# Patient Record
Sex: FEMALE | Race: ASIAN | NOT HISPANIC OR LATINO
[De-identification: names, ages, dates, MRNs, and addresses within clinical notes are randomized per-mention and may not be internally consistent; named-entity substitution may affect disease eponyms.]

---
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
## 2020-02-21 ENCOUNTER — TRANSCRIPTION ENCOUNTER (OUTPATIENT)
Age: 20
End: 2020-02-21

## 2020-02-26 ENCOUNTER — TRANSCRIPTION ENCOUNTER (OUTPATIENT)
Age: 20
End: 2020-02-26

## 2020-03-04 PROBLEM — Z00.00 ENCOUNTER FOR PREVENTIVE HEALTH EXAMINATION: Status: ACTIVE | Noted: 2020-03-04

## 2020-03-05 ENCOUNTER — APPOINTMENT (OUTPATIENT)
Dept: OTOLARYNGOLOGY | Facility: CLINIC | Age: 20
End: 2020-03-05
Payer: COMMERCIAL

## 2020-03-05 DIAGNOSIS — Z86.2 PERSONAL HISTORY OF DISEASES OF THE BLOOD AND BLOOD-FORMING ORGANS AND CERTAIN DISORDERS INVOLVING THE IMMUNE MECHANISM: ICD-10-CM

## 2020-03-05 DIAGNOSIS — Z83.3 FAMILY HISTORY OF DIABETES MELLITUS: ICD-10-CM

## 2020-03-05 PROCEDURE — 31231 NASAL ENDOSCOPY DX: CPT

## 2020-03-05 PROCEDURE — 99213 OFFICE O/P EST LOW 20 MIN: CPT | Mod: 25

## 2020-03-11 LAB — BACTERIA FLD CULT: NORMAL

## 2021-02-24 ENCOUNTER — APPOINTMENT (OUTPATIENT)
Dept: OTOLARYNGOLOGY | Facility: CLINIC | Age: 21
End: 2021-02-24
Payer: COMMERCIAL

## 2021-02-24 VITALS — HEIGHT: 64 IN | TEMPERATURE: 98 F | WEIGHT: 120 LBS | BODY MASS INDEX: 20.49 KG/M2

## 2021-02-24 PROCEDURE — 99214 OFFICE O/P EST MOD 30 MIN: CPT | Mod: 25

## 2021-02-24 PROCEDURE — 99072 ADDL SUPL MATRL&STAF TM PHE: CPT

## 2021-02-24 PROCEDURE — 31231 NASAL ENDOSCOPY DX: CPT

## 2021-03-01 ENCOUNTER — APPOINTMENT (OUTPATIENT)
Dept: ULTRASOUND IMAGING | Facility: CLINIC | Age: 21
End: 2021-03-01
Payer: COMMERCIAL

## 2021-03-01 ENCOUNTER — OUTPATIENT (OUTPATIENT)
Dept: OUTPATIENT SERVICES | Facility: HOSPITAL | Age: 21
LOS: 1 days | End: 2021-03-01

## 2021-03-01 ENCOUNTER — RESULT REVIEW (OUTPATIENT)
Age: 21
End: 2021-03-01

## 2021-03-01 ENCOUNTER — APPOINTMENT (OUTPATIENT)
Dept: CT IMAGING | Facility: CLINIC | Age: 21
End: 2021-03-01
Payer: COMMERCIAL

## 2021-03-01 PROCEDURE — 76536 US EXAM OF HEAD AND NECK: CPT | Mod: 26

## 2021-03-01 PROCEDURE — 70486 CT MAXILLOFACIAL W/O DYE: CPT | Mod: 26

## 2021-03-02 ENCOUNTER — TRANSCRIPTION ENCOUNTER (OUTPATIENT)
Age: 21
End: 2021-03-02

## 2021-03-02 LAB — BACTERIA FLD CULT: ABNORMAL

## 2021-03-03 NOTE — PHYSICAL EXAM
[FreeTextEntry1] : General:\par The patient was alert and oriented and in no distress.\par Voice was clear. Her temperature was 98.0\par \par Face:\par The patient had no facial asymmetry or mass.\par The skin was unremarkable.\par \par Ears:\par The external ears were normal without deformity.\par The ear canals were clear.\par The tympanic membranes were intact and normal.\par \par Nose: \par The external nose had no significant deformity.  There was no facial tenderness.  On anterior rhinoscopy, the nasal mucosa was clear.  The anterior septum was midline.  There were no visualized polyps purulence  or masses.\par \par Neck:\par She had a moderately tender 2 cm right level V lymph node\par \par Oral cavity:\par The oral mucosa was normal.\par The oral and base of tongue were clear and without mass.\par The gingival and buccal mucosa were moist and without lesions.\par The palate moved well.\par There was no cleft to the palate.\par There appeared to be good salivary flow.  \par There was no pus, erythema or mass in the oral cavity.\par \par Neuro:\par Neurologically, the patient was awake, alert, and oriented to person, place and time. There were no obvious focal neurologic abnormalities.  Cranial nerves II through XII were grossly intact.\par \par Nasal endoscopy: \par CPT 01201\par Procedure Note:\par \par Endoscopy was done with Covid precautions and with video. All risks and benefits were discussed with the patient and consent obtained.\par \par Nasal endoscopy was done with topical anesthesia of Pontocaine and Afrin and a      nasal endoscope.\par Indication: Nasal congestion, rule out sinusitis.\par Procedure: The nasal cavity was anesthetized with topical Afrin and Pontocaine. An  endoscope was used and inserted into the nasal cavity.\par Attention was first paid to the anterior nasal cavity.\par Endocoscopy was performed to inspect the interior of the nasal cavity, the nasal septum,  the middle and superior meati, the inferior, middle and superior turbinates, and the spheno-ethmoidal  recesses, the nasopharynx and eustachian tube orifices bilaterally. \par All findings were normal except:\par \par \par Nasal mucosa is beefy and inflamed with a right septal deflection coming back to the left. There was thick dry but clear mucus in the middle meati were no polyps, purulence or masses\par The nasopharynx is benign\par \par Flexible fiberoptic laryngoscopy: [facility] [address]\par Indications: Dysphagia\par Procedure note:\par \par Flexible fiberoptic laryngoscopy was performed because of dysphagia and because of the patient's inability to tolerate adequate mirror examination.\par \par The nasal cavity was anesthetized with Pontocaine and Afrin.\par The flexible endoscope was placed into the patient's nasal cavity.\par The nasopharynx was without masses.\par The oropharynx, vallecula and base of tongue had no masses.\par The epiglottis, aryepiglottic folds and false vocal cords were normal.\par The pyriform sinuses were without mucosal lesions or pooling of secretions.  \par The laryngeal ventricles were without lesions.\par The visualized subglottis was without mass.\par The lateral and posterior pharyngeal walls were clear and symmetrical.\par The vocal folds were clear and mobile; they abducted and adducted normally.\par There was no interarytenoid mass or fullness.\par \par Endoscopy was done with Covid precautions and with video. All risks and benefits were discussed with the patient and consent obtained.\par \par Then endoscopically guided culture was taken of the right middle meatus\par

## 2021-03-03 NOTE — CONSULT LETTER
[DrAkua  ___] : Dr. AARON [FreeTextEntry2] : Glen Sheth MD [FreeTextEntry1] : \par \par Dear  Glen\par \par I had the pleasure of seeing your patient today.  \par Please see my note below.\par \par \par Thank you very much for allowing me to participate in the care of your patient.\par \par Sincerely,\par \par \par I hope this note finds you and your family  well.   \par \par \par Sumeet\par \par \par \par \par Hans Baptiste MD\par [facility]\par [facility]\par  [facility]\par \par

## 2021-03-03 NOTE — HISTORY OF PRESENT ILLNESS
[de-identified] : BILL WESTBROOK In consultation from Dr. Sheth on February 24.She has a long history of allergies and sinus disease. However, for the last 2-1/2 months she has not been doing well. She has felt nauseous and dehydrated. She has had fevers sometimes 100.6 and more recently 102, even through a week of Augmentin. She has headaches and a relatively clear drainage and feels as if she is gagging. She had evaluation including blood tests which were relatively normal other than for slightly elevated leukocyte count. She's had a swollen lymph node in the neck but recently has gotten smaller.She had a general medical evaluation as well.The patient had no other ear nose or throat complaints at this visit.

## 2021-03-03 NOTE — ADDENDUM
[FreeTextEntry1] : 3/2/21    Culture scant p acnes.  (sensitive)\par 3/3/21   ultrasound   Left level 1 and several right level 5 nodes.\par 3/3/21   ct shows dns, narrowed omu with holden bullosa-  no signficant persistent mp disease.\par     benign appearing suprahyoid nodes.\par \par Consider ID consult/fna... if persists.

## 2021-03-03 NOTE — ASSESSMENT
[FreeTextEntry1] : It was my impression that she has a probable underlying chronic sinusitis and nasal allergies and septal deflection. However, her current symptoms including fever are not likely from sinusitis.\par She has adenopathy and intermittent fever.\par She had a very slight elevation of her lymphocyte count.\par Her Covid testing was negative, but this seems more likely viral especially without improvement with Augmentin.\par I suggested continuing with antibiotics and add Flonase as and nasal steroid pending culture results, as this could be a persistent organism.\par I recommended a brief course of prednisone and suggested both sinus imaging an ultrasound of the neck.\par If the adenopathy persists I would plan fine-needle aspiration as well.  \par \par \par

## 2021-03-08 ENCOUNTER — APPOINTMENT (OUTPATIENT)
Dept: OTOLARYNGOLOGY | Facility: CLINIC | Age: 21
End: 2021-03-08
Payer: COMMERCIAL

## 2021-03-08 VITALS — TEMPERATURE: 97 F | WEIGHT: 120 LBS | HEIGHT: 64 IN | BODY MASS INDEX: 20.49 KG/M2

## 2021-03-08 DIAGNOSIS — R59.0 LOCALIZED ENLARGED LYMPH NODES: ICD-10-CM

## 2021-03-08 DIAGNOSIS — J01.01 ACUTE RECURRENT MAXILLARY SINUSITIS: ICD-10-CM

## 2021-03-08 DIAGNOSIS — J34.89 OTHER SPECIFIED DISORDERS OF NOSE AND NASAL SINUSES: ICD-10-CM

## 2021-03-08 DIAGNOSIS — J30.9 ALLERGIC RHINITIS, UNSPECIFIED: ICD-10-CM

## 2021-03-08 DIAGNOSIS — J01.21 ACUTE RECURRENT ETHMOIDAL SINUSITIS: ICD-10-CM

## 2021-03-08 PROCEDURE — 99214 OFFICE O/P EST MOD 30 MIN: CPT | Mod: 25

## 2021-03-08 PROCEDURE — 99072 ADDL SUPL MATRL&STAF TM PHE: CPT

## 2021-03-08 PROCEDURE — 31231 NASAL ENDOSCOPY DX: CPT

## 2021-04-19 ENCOUNTER — TRANSCRIPTION ENCOUNTER (OUTPATIENT)
Age: 21
End: 2021-04-19

## 2021-04-19 DIAGNOSIS — Z01.818 ENCOUNTER FOR OTHER PREPROCEDURAL EXAMINATION: ICD-10-CM

## 2021-04-20 LAB
ANION GAP SERPL CALC-SCNC: 15 MMOL/L
APTT BLD: 33.3 SEC
BASOPHILS # BLD AUTO: 0.06 K/UL
BASOPHILS NFR BLD AUTO: 0.9 %
BUN SERPL-MCNC: 9 MG/DL
CALCIUM SERPL-MCNC: 9.6 MG/DL
CHLORIDE SERPL-SCNC: 102 MMOL/L
CO2 SERPL-SCNC: 23 MMOL/L
CREAT SERPL-MCNC: 0.72 MG/DL
EOSINOPHIL # BLD AUTO: 0.13 K/UL
EOSINOPHIL NFR BLD AUTO: 2 %
GLUCOSE SERPL-MCNC: 57 MG/DL
HCT VFR BLD CALC: 41.4 %
HGB BLD-MCNC: 12.9 G/DL
IMM GRANULOCYTES NFR BLD AUTO: 0.6 %
INR PPP: 1.15 RATIO
LYMPHOCYTES # BLD AUTO: 2.95 K/UL
LYMPHOCYTES NFR BLD AUTO: 45.6 %
MAN DIFF?: NORMAL
MCHC RBC-ENTMCNC: 27.2 PG
MCHC RBC-ENTMCNC: 31.2 GM/DL
MCV RBC AUTO: 87.2 FL
MONOCYTES # BLD AUTO: 0.4 K/UL
MONOCYTES NFR BLD AUTO: 6.2 %
NEUTROPHILS # BLD AUTO: 2.89 K/UL
NEUTROPHILS NFR BLD AUTO: 44.7 %
PLATELET # BLD AUTO: 314 K/UL
POTASSIUM SERPL-SCNC: 4.3 MMOL/L
PT BLD: 13.5 SEC
RBC # BLD: 4.75 M/UL
RBC # FLD: 13.6 %
SODIUM SERPL-SCNC: 140 MMOL/L
WBC # FLD AUTO: 6.47 K/UL

## 2021-04-20 NOTE — ASSESSMENT
[FreeTextEntry1] : It was my impression that her acute presentation was that of a resolving viral syndrome with fever and adenopathy and a very slight elevated white blood cell count. Her lyme titers were negative but I did also want her to have mono titers taken and this was recommended. Otherwise I would not further evaluate unless some of the symptoms persist she still not some fatigue and colored discharge\par Her chronic symptoms although from her nose and sinuses, however. Since childhood she has nasal obstruction. She has been treated for her allergies. Her exam and her CT showed a significant inferior turbinate mucosal edema deep bogginess of the septal mucosa with the sharp right obstructing septal deflection. 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 \par She also is a vocalist.  I explained to her that there could be some and in fact would likely be some change to her resonance.  She may find this an improvement or worsening of her vocal qualities and I explained that she should decide about whether to go ahead with the possibility of worsening of her vocal qualities.   I would consider using an LMA at any surgical procedure so as to not put a tube to her vocal cords\par

## 2021-04-20 NOTE — ADDENDUM
[FreeTextEntry1] : 3/11/21  mono negative.  RLP\par \par 4/20/21   glucose 57, slight lymphocytosis.   negative covid test.  RLP

## 2021-04-20 NOTE — CONSULT LETTER
[DrAkua  ___] : Dr. AARON [FreeTextEntry2] : Glen Sheth MD [FreeTextEntry1] : \par \par Dear  Glen\par \par I had the pleasure of seeing your patient today.  \par Please see my note below.\par \par \par Thank you very much for allowing me to participate in the care of your patient.\par \par Sincerely,\par \par \par I hope this note finds you and your family  well.   \par \par \par Sumeet\par \par \par \par \par Hans Baptiste MD\par [facility]\par [facility]\par  [facility]\par \par

## 2021-04-20 NOTE — PHYSICAL EXAM
[FreeTextEntry1] : General:\par The patient was alert and oriented and in no distress.\par Voice was clear. She looked better.\par \par Face:\par The patient had no facial asymmetry or mass.\par The skin was unremarkable.\par \par Ears:\par The external ears were normal without deformity.\par The ear canals were clear.\par The tympanic membranes were intact and normal.\par \par Oral cavity:\par The oral mucosa was normal.\par The oral and base of tongue were clear and without mass.\par The gingival and buccal mucosa were moist and without lesions.\par The palate moved well.\par There was no cleft to the palate.\par There appeared to be good salivary flow.  \par There was no pus, erythema or mass in the oral cavity.\par \par Neck: \par The neck was symmetrical.\par The parotid and submandibular glands were normal without masses.\par The trachea was midline and there was no unusual crepitus.\par The thyroid was smooth and nontender and no masses were palpated.\par There was no significant cervical adenopathy.\par The adenopathy was much better\par \par Neuro:\par Neurologically, the patient was awake, alert, and oriented to person, place and time. There were no obvious focal neurologic abnormalities.  Cranial nerves II through XII were grossly intact.\par \par  [de-identified] : Nasal endoscopy: \par CPT 61552\par Procedure Note:\par \par Endoscopy was done with Covid precautions and with video. All risks and benefits were discussed with the patient and consent obtained.\par \par Nasal endoscopy was done with topical anesthesia of Pontocaine and Afrin and a      nasal endoscope.\par Indication: Nasal congestion, rule out sinusitis.\par Procedure: The nasal cavity was anesthetized with topical Afrin and Pontocaine. An  endoscope was used and inserted into the nasal cavity.\par Attention was first paid to the anterior nasal cavity.\par Endocoscopy was performed to inspect the interior of the nasal cavity, the nasal septum,  the middle and superior meati, the inferior, middle and superior turbinates, and the spheno-ethmoidal  recesses, the nasopharynx and eustachian tube orifices bilaterally. \par All findings were normal except:\par \par This was done with her CT for guidance.\par This showed that the nasal mucosa was pale boggy and watery. She had significant inferior turbinate hypertrophy bilaterally almost obstructing the airway and in narrowing of both osteomeatal units with a right septal deflection\par \par Her CT scan showed the inferior turbinate hypertrophy and a septal deflection. She has hyperinflation of her ethmoids and significant narrowing of the osteomeatal units.\par She has hyper pneumatization of the ethmoids and she has holden bullosa bilaterally without significant frontal sinus formation.

## 2021-04-20 NOTE — HISTORY OF PRESENT ILLNESS
[de-identified] : BILL WESTBROOK Was seen in followup on March 8. She was doing better. She no longer had a fever and her adenopathy was improved. She still gets a bit of color to nasal discharge. She had ultrasound of the neck which showed multiple small benign appearing lymph nodes. She had CT imaging of the paranasal sinuses and comes in for repeat evaluation.The patient had no other ear nose or throat complaints at this visit.

## 2021-04-21 ENCOUNTER — TRANSCRIPTION ENCOUNTER (OUTPATIENT)
Age: 21
End: 2021-04-21

## 2021-04-22 ENCOUNTER — RESULT REVIEW (OUTPATIENT)
Age: 21
End: 2021-04-22

## 2021-04-22 ENCOUNTER — OUTPATIENT (OUTPATIENT)
Dept: OUTPATIENT SERVICES | Facility: HOSPITAL | Age: 21
LOS: 1 days | Discharge: HOME | End: 2021-04-22
Payer: COMMERCIAL

## 2021-04-22 ENCOUNTER — APPOINTMENT (OUTPATIENT)
Dept: OTOLARYNGOLOGY | Facility: AMBULATORY SURGERY CENTER | Age: 21
End: 2021-04-22

## 2021-04-22 PROCEDURE — 61782 SCAN PROC CRANIAL EXTRA: CPT

## 2021-04-22 PROCEDURE — 30520 REPAIR OF NASAL SEPTUM: CPT

## 2021-04-22 PROCEDURE — 88305 TISSUE EXAM BY PATHOLOGIST: CPT | Mod: 26

## 2021-04-22 PROCEDURE — 88300 SURGICAL PATH GROSS: CPT | Mod: 26,59

## 2021-04-22 PROCEDURE — 31267 ENDOSCOPY MAXILLARY SINUS: CPT | Mod: 50

## 2021-04-22 PROCEDURE — 31255 NSL/SINS NDSC W/TOT ETHMDCT: CPT | Mod: 50

## 2021-04-22 PROCEDURE — 88311 DECALCIFY TISSUE: CPT | Mod: 26

## 2021-04-22 PROCEDURE — 30140 RESECT INFERIOR TURBINATE: CPT | Mod: 50

## 2021-04-28 ENCOUNTER — APPOINTMENT (OUTPATIENT)
Dept: OTOLARYNGOLOGY | Facility: CLINIC | Age: 21
End: 2021-04-28
Payer: COMMERCIAL

## 2021-04-28 VITALS — WEIGHT: 120 LBS | TEMPERATURE: 96.6 F | BODY MASS INDEX: 20.49 KG/M2 | HEIGHT: 64 IN

## 2021-04-28 PROCEDURE — 31237 NSL/SINS NDSC SURG BX POLYPC: CPT | Mod: 50,58

## 2021-04-28 PROCEDURE — 99072 ADDL SUPL MATRL&STAF TM PHE: CPT

## 2021-04-28 NOTE — HISTORY OF PRESENT ILLNESS
[de-identified] : BILL WESTBROOK Was seen in followup on April 28 her fifth postoperative day. She had an obstructing left septal deflection coming back to the right and bilateral middle meatal obstruction. Her surgery was uneventful in her early postoperative course has been good. She comes in for first postoperative debridement

## 2021-04-28 NOTE — PHYSICAL EXAM
[FreeTextEntry1] : The patient returns for postoperative sinus debridement CPT 44357-43.\par \par Procedure note:\par \par The nasal cavity was anesthetized topically and locally.  Both rigid 0 and 30° endoscopes were used for debridement.  The septal splint was removed.  There was no evidence of hematoma.  Using straight and curved suctions and straight and 30° up-biting forceps, the nasal cavity and sinuses were debrided anteriorly.  The patient appeared to be having an excellent result.  Propel was left in place.  There were no complications.  I placed the patient on budesonide rinse and hypertonic saline rinses and will see the patient back in 7-10 days for a repeat middle meatal debridement.\par \par

## 2021-04-28 NOTE — CONSULT LETTER
[FreeTextEntry2] : Glen Sheth MD [FreeTextEntry1] : \par \par Dear  Glen\par \par I had the pleasure of seeing your patient today.  \par Please see my note below.\par \par \par Thank you very much for allowing me to participate in the care of your patient.\par \par Sincerely,\par \par \par I hope this note finds you and your family  well.   \par \par \par Sumeet\par \par \par \par \par Hans Baptiste MD\par [facility]\par [facility]\par  [facility]\par \par  [DrAkua  ___] : Dr. AARON

## 2021-05-02 LAB — SURGICAL PATHOLOGY STUDY: SIGNIFICANT CHANGE UP

## 2021-05-05 ENCOUNTER — APPOINTMENT (OUTPATIENT)
Dept: OTOLARYNGOLOGY | Facility: CLINIC | Age: 21
End: 2021-05-05
Payer: COMMERCIAL

## 2021-05-05 VITALS — HEIGHT: 64 IN | BODY MASS INDEX: 20.49 KG/M2 | TEMPERATURE: 93.7 F | WEIGHT: 120 LBS

## 2021-05-05 PROCEDURE — 31237 NSL/SINS NDSC SURG BX POLYPC: CPT | Mod: 50,58

## 2021-05-05 PROCEDURE — 99072 ADDL SUPL MATRL&STAF TM PHE: CPT

## 2021-05-05 NOTE — PHYSICAL EXAM
[FreeTextEntry1] : The patient is status post endoscopic sinus surgery and returns for middle meatal debridement.\par \par Procedure note:  99887-96\par \par The nasal cavity was anesthetized topically and locally.  Using a rigid 0° and a rigid 30° endoscope, using both straight and curved suction iand a straight and the 30° up-biting forceps, both middle meati were suctioned and debrided to clear.  The ethmoids, antra and frontal recesses appeared to be having an excellent result.  The septum was midline and without hematoma.  The inferior turbinates were debrided again. \par \par She appears to be having an excellent result and I suggested using budesonide rinses twice a day

## 2021-05-05 NOTE — CONSULT LETTER
[FreeTextEntry2] : Glen Sheth MD [FreeTextEntry1] : \par \par Dear  Glen\par \par I had the pleasure of seeing your patient today.  \par Please see my note below.\par \par \par Thank you very much for allowing me to participate in the care of your patient.\par \par Sincerely,\par \par \par I hope this note finds you and your family  well.   \par \par \par Sumeet\par \par \par \par \par Hans Baptiste MD\par [facility]\par [facility]\par  [facility]\par \par  [DrAkua  ___] : Dr. AARON

## 2021-05-17 ENCOUNTER — APPOINTMENT (OUTPATIENT)
Dept: OTOLARYNGOLOGY | Facility: CLINIC | Age: 21
End: 2021-05-17
Payer: COMMERCIAL

## 2021-05-17 VITALS — WEIGHT: 120 LBS | HEIGHT: 64 IN | TEMPERATURE: 96.4 F | BODY MASS INDEX: 20.49 KG/M2

## 2021-05-17 PROCEDURE — 31237 NSL/SINS NDSC SURG BX POLYPC: CPT | Mod: 50,58

## 2021-05-17 PROCEDURE — 99072 ADDL SUPL MATRL&STAF TM PHE: CPT

## 2021-05-17 NOTE — HISTORY OF PRESENT ILLNESS
[de-identified] : BILL WESTBROOK Was seen in followup on May 17. She is status post endoscopic sinus surgery for an obstructing left septal deflection coming back to the right with bilateral middle meatal obstruction on April 23 and early polypoid changes. She is doing quite well but notices some discharge still. She comes in for postoperative debridement. She remains on budesonide rinses, hopefully once a day.The patient had no other ear nose or throat complaints at this visit.

## 2021-05-17 NOTE — PHYSICAL EXAM
[FreeTextEntry1] : The patient is status post endoscopic sinus surgery and returns for middle meatal debridement.\par \par Procedure note:  66546-61\par \par The nasal cavity was anesthetized topically and locally.  Using a rigid 0° and a rigid 30° endoscope, using both straight and curved suction iand a straight and the 30° up-biting forceps, both middle meati were suctioned and debrided to clear. She still had some residual propel, more on the right than on the left The ethmoids, antra and frontal recesses appeared to be having an excellent result.  The septum was midline and without hematoma.  The inferior turbinates were debrided again.  The patient was told to continue on budesonide rinse once a day I will see her back in followup one more time in a month and indicated she can still use to rule over-the-counter antihistamines as needed for her allergy season

## 2021-05-21 ENCOUNTER — APPOINTMENT (OUTPATIENT)
Dept: OTOLARYNGOLOGY | Facility: CLINIC | Age: 21
End: 2021-05-21
Payer: COMMERCIAL

## 2021-05-21 VITALS — HEIGHT: 64 IN | BODY MASS INDEX: 20.49 KG/M2 | WEIGHT: 120 LBS | TEMPERATURE: 94.5 F

## 2021-05-21 PROCEDURE — 99072 ADDL SUPL MATRL&STAF TM PHE: CPT

## 2021-05-21 PROCEDURE — 31237 NSL/SINS NDSC SURG BX POLYPC: CPT | Mod: 58,RT

## 2021-05-28 NOTE — ASSESSMENT
[FreeTextEntry1] : It was my impression that she has residual propel and a secondary postoperative infection on the right. Pending the culture results, I recommended a course of cefuroxime and continuing with nasal rinses and would like to see her back in followup in 2-3 weeks to make sure that this has resolved. Otherwise, the sinuses looked excellent.

## 2021-05-28 NOTE — ADDENDUM
[FreeTextEntry1] : 5/28/21  finally,  culture came back today.  While had a prelim of staph and enterobacter- final just enterobacter.  was resistant to ceftin and rather than just swithcing now, will wait and see her back next week.  Iff not better, will switch-  but she is allergic to bactrim.   \par \par RLP

## 2021-05-28 NOTE — PHYSICAL EXAM
[de-identified] : On examination, pertinent findings were limited to her nasal endoscopy.\par \par On the left side, the septum was midline the middle turbinate was reduced and the sinuses were all widely patent and healthy.\par On the right, there was yellow green discharge and crusting mixed with a small amount of residual propel. With further anesthesia and a rigid pediatric 30° endoscope, the crusting and residual propel were removed sharply. The-colored discharge was cultured and then the ethmoid and maxillary sinuses suctioned to clear\par CPT 07034\par \par

## 2021-05-28 NOTE — HISTORY OF PRESENT ILLNESS
[de-identified] : BILL WESTBROOK Was seen in followup on May 21. She comes in because she feels as if there is still something in the sinuses on the right. She is having some yellow color with the rinses.\par The patient had no other ear nose or throat complaints at this visit.

## 2021-06-04 ENCOUNTER — APPOINTMENT (OUTPATIENT)
Dept: OTOLARYNGOLOGY | Facility: CLINIC | Age: 21
End: 2021-06-04
Payer: COMMERCIAL

## 2021-06-04 VITALS — WEIGHT: 120 LBS | TEMPERATURE: 97.9 F | BODY MASS INDEX: 20.49 KG/M2 | HEIGHT: 64 IN

## 2021-06-04 DIAGNOSIS — J34.2 DEVIATED NASAL SEPTUM: ICD-10-CM

## 2021-06-04 PROCEDURE — 99024 POSTOP FOLLOW-UP VISIT: CPT

## 2021-06-04 PROCEDURE — 31231 NASAL ENDOSCOPY DX: CPT | Mod: 58

## 2021-06-04 NOTE — PHYSICAL EXAM
[FreeTextEntry1] : \par The patient was alert and oriented and in no distress.\par Voice was clear.\par \par Face:\par The patient had no facial asymmetry or mass.\par The skin was unremarkable.\par \par Eyes:\par The pupils were equal round and reactive to light and accommodation.\par There was no significant nystagmus or disconjugate gaze noted.\par \par Nose: \par The external nose had no significant deformity.  There was no facial tenderness.  On anterior rhinoscopy, the nasal mucosa was clear.  The anterior septum was midline.  There were no visualized polyps purulence  or masses.\par \par Oral cavity:\par The oral mucosa was normal.\par The oral and base of tongue were clear and without mass.\par The gingival and buccal mucosa were moist and without lesions.\par The palate moved well.\par There was no cleft to the palate.\par There appeared to be good salivary flow.  \par There was no pus, erythema or mass in the oral cavity.\par \par \par Ears:\par The external ears were normal without deformity.\par The ear canals were clear.\par The tympanic membranes were intact and normal.\par \par Neck: \par The neck was symmetrical.\par The parotid and submandibular glands were normal without masses.\par The trachea was midline and there was no unusual crepitus.\par The thyroid was smooth and nontender and no masses were palpated.\par There was no significant cervical adenopathy.\par \par \par Neuro:\par Neurologically, the patient was awake, alert, and oriented to person, place and time. There were no obvious focal neurologic abnormalities.  Cranial nerves II through XII were grossly intact.\par \par \par TMJ:\par The temporomandibular joints were nontender.\par There was no abnormal crepitus and no significant malocclusion\par \par \par Nasal endoscopy:\par \par Nasal endoscopy was done with topical anesthesia and a flexible endoscope to evaluate for nasal polyps, chronic sinusitis and response to therapy.\par Endocoscopy was performed to inspect the interior of the nasal cavity, the nasal septum,  the middle and superior meati, the inferior, middle and superior turbinates, and the spheno-ethmoidal  recesses, the nasopharynx and eustachian tube orifices bilaterally\par \par Endoscopy was done with Covid precautions and with video. All risks and benefits were discussed with the patient and consent obtained.\par \par \par Evaluation showed that the septum was midline.  There was no significant mucosal disease.  The inferior turbinates and middle turbinates were normal.  On both sides, the surgically opened ethmoids, antra, and frontal recesses were clear.  There was no evidence of polypoid recurrences and no purulence.  The mucosa was close to stage zero.  The nasopharynx was benign.    The middle and  superior meati were normal and the sphenoethmoidal recesses were without evidence of disease\par There was a small area of granulation healing on the right lateral wall

## 2021-06-04 NOTE — CONSULT LETTER
[DrAkua  ___] : Dr. AARON [FreeTextEntry2] : Glen Sheth MD [FreeTextEntry1] : \par \par Dear  Glen\par \par I had the pleasure of seeing your patient today.  \par Please see my note below.\par \par \par Thank you very much for allowing me to participate in the care of your patient.\par \par Sincerely,\par \par \par \par I hope this note finds you and your family  well.    Congratulations on your new office... Saw it in [employer]...... \par \par \par Sumeet\par \par \par \par \par Hans Baptiste MD\par [facility]\par [facility]\par  [facility]\par \par

## 2021-06-04 NOTE — HISTORY OF PRESENT ILLNESS
[de-identified] : BILL WESTBROOK Was seen in followup on June 4.She is status post endoscopic sinus surgery on April 22 for chronic sinusitis septal deflection and polypoid degeneration. At this point she's doing quite well. She is using budesonide rinses once a day. She has significant allergies and has had desensitization for more than 4 years.She comes in for repeat evaluation

## 2021-06-04 NOTE — ASSESSMENT
[FreeTextEntry1] : It was my impression that she had an excellent result from this surgery. However, with her significant allergy history I suggested continuing on medical and allergy care. She has had desensitization to maximal effect. I explained the likelihood of polyps recurring should she do no further intervention. At this point, she will try to use the budesonide rinse every other day and I would like to reevaluate in 3 months to make sure there is no recurrence. The option of an interleukin antagonist-like Dupixent was discussed but not recommended at this time.  I asked her to speak to Dr. Sheth as well.

## 2021-08-20 ENCOUNTER — APPOINTMENT (OUTPATIENT)
Dept: OTOLARYNGOLOGY | Facility: CLINIC | Age: 21
End: 2021-08-20
Payer: COMMERCIAL

## 2021-08-20 VITALS — WEIGHT: 120 LBS | HEIGHT: 64 IN | TEMPERATURE: 97.2 F | BODY MASS INDEX: 20.49 KG/M2

## 2021-08-20 DIAGNOSIS — J34.3 HYPERTROPHY OF NASAL TURBINATES: ICD-10-CM

## 2021-08-20 DIAGNOSIS — J32.4 CHRONIC PANSINUSITIS: ICD-10-CM

## 2021-08-20 DIAGNOSIS — K21.9 GASTRO-ESOPHAGEAL REFLUX DISEASE W/OUT ESOPHAGITIS: ICD-10-CM

## 2021-08-20 PROCEDURE — 31231 NASAL ENDOSCOPY DX: CPT

## 2021-08-20 PROCEDURE — 99214 OFFICE O/P EST MOD 30 MIN: CPT | Mod: 25

## 2021-08-20 NOTE — CONSULT LETTER
[DrAkua  ___] : Dr. AARON [FreeTextEntry2] : Glen Sheth MD [FreeTextEntry1] : \par \par Dear  Glen\par \par I had the pleasure of seeing your patient today.  \par Please see my note below.\par \par \par Thank you very much for allowing me to participate in the care of your patient.\par \par Sincerely,\par \par \par \par I hope this note finds you and your family  well.    Congratulations on your new office... Saw it in [employer]...... \par \par \par Sumeet\par \par \par \par \par Hans Baptiste MD\par [facility]\par [facility]\par  [facility]\par \par

## 2021-08-20 NOTE — HISTORY OF PRESENT ILLNESS
[de-identified] : BILL WESTBROOK Was seen in followup on August 20th.She is status post endoscopic sinus surgery on April 22 for chronic sinusitis septal deflection and polypoid degeneration. At this point she's doing quite well. She is using budesonide rinses once a day. She has significant allergies and has had desensitization for more than 4 years and finished a month and a half ago.  Her airway is quite good and her sense of smell is intact however, at times she feels as if her throat is closing and the clear mucous is now slightly yellow at times.The patient had no other ear nose or throat complaints at this visit.

## 2021-08-20 NOTE — PHYSICAL EXAM
[FreeTextEntry1] : \par The patient was alert and oriented and in no distress.\par Voice was clear.\par \par Face:\par The patient had no facial asymmetry or mass.\par The skin was unremarkable.\par \par Eyes:\par The pupils were equal round and reactive to light and accommodation.\par There was no significant nystagmus or disconjugate gaze noted.\par \par Nose: \par The external nose had no significant deformity.  There was no facial tenderness.  On anterior rhinoscopy, the nasal mucosa was clear.  The anterior septum was midline.  There were no visualized polyps purulence  or masses.\par \par Oral cavity:\par The oral mucosa was normal.\par The oral and base of tongue were clear and without mass.\par The gingival and buccal mucosa were moist and without lesions.\par The palate moved well.\par There was no cleft to the palate.\par There appeared to be good salivary flow.  \par There was no pus, erythema or mass in the oral cavity.\par \par \par Ears:\par The external ears were normal without deformity.\par The ear canals were clear.\par The tympanic membranes were intact and normal.\par \par Neck: \par The neck was symmetrical.\par The parotid and submandibular glands were normal without masses.\par The trachea was midline and there was no unusual crepitus.\par The thyroid was smooth and nontender and no masses were palpated.\par There was no significant cervical adenopathy.\par \par \par Neuro:\par Neurologically, the patient was awake, alert, and oriented to person, place and time. There were no obvious focal neurologic abnormalities.  Cranial nerves II through XII were grossly intact.\par \par \par TMJ:\par The temporomandibular joints were nontender.\par There was no abnormal crepitus and no significant malocclusion\par \par  [de-identified] : Nasal endoscopy:\par \par Nasal endoscopy was done with topical anesthesia and a flexible endoscope to evaluate for nasal polyps, chronic sinusitis and response to therapy.\par Endocoscopy was performed to inspect the interior of the nasal cavity, the nasal septum,  the middle and superior meati, the inferior, middle and superior turbinates, and the spheno-ethmoidal  recesses, the nasopharynx and eustachian tube orifices bilaterally\par \par Endoscopy was done with Covid precautions and with video. All risks and benefits were discussed with the patient and consent obtained.\par \par \par Evaluation showed that the septum was midline.  There was no significant mucosal disease.  The inferior turbinates and middle turbinates were normal.  On both sides, the surgically opened ethmoids, antra, and frontal recesses were clear.  There was no evidence of polypoid recurrences and no purulence.  The mucosa was close to stage zero.  The nasopharynx was benign.    The middle and  superior meati were normal and the sphenoethmoidal recesses were without evidence of disease\par \par \par Flexible fiberoptic laryngoscopy: [facility] [id number]\par Indications: Dysphagia\par Procedure note:\par  \par Flexible fiberoptic laryngoscopy was performed because of dysphagia and the patient's inability to tolerate adequate mirror examination.\par The nasal cavity was anesthetized with Pontocaine plus Afrin.\par \par \par The nasal cavity was anesthetized with Pontocaine and Afrin.\par The flexible endoscope was placed into the patient's nasal cavity.\par The nasopharynx was without masses.\par The oropharynx, vallecula and base of tongue had no masses.\par The epiglottis, aryepiglottic folds and false vocal cords were normal.\par The pyriform sinuses were without mucosal lesions or pooling of secretions.  \par The laryngeal ventricles were without lesions.\par The visualized subglottis was without mass.\par The lateral and posterior pharyngeal walls were clear and symmetrical.\par The vocal folds were clear and mobile; they abducted and adducted normally.\par There was no interarytenoid mass or fullness.\par There was significant posterior laryngeal inflammation consistent with reflux.\par \par Endoscopy was done with Covid precautions and with video. All risks and benefits were discussed with the patient and consent obtained.\par \par

## 2021-08-20 NOTE — ASSESSMENT
[FreeTextEntry1] : 1.   It was my impression that she has a history of chronic sinusitis with nasal polyps. At this point, her sinuses looked excellent without evidence of recurrent disease. There is no evidence of recurrent polyposis although the mucosa is still somewhat boggy. She has completed her course of desensitization. At this point, I recommended continuing with budesonide rinses.\par I explained the options for further treatment if the polyps begin to recur including interleukin antagonists and the drug eluding stents.  However, at this point I did not so recommend\par \par 2.  It sounds like she is getting some allergic to penicillin in the past times and I explained this to her. She could increase the rinses with just saline but cannot take guaifenesin because of her gut\par There is no evidence of infection\par \par 3.  It was my impression is that the patient's  throat symptoms were from laryngeal evidence of reflux.  I reviewed an anti-reflux diet at length with the patient.  I recommended a course of famotidine 40 mg at bedtime.  I suggested a repeat evaluation in 4-6 weeks to make sure that the patient is improving.  If not I would recommend further evaluation including possibly pH testing, PPI therapy and/or further GI evaluation.\par She notes she gets acid reflux..

## 2021-10-22 ENCOUNTER — APPOINTMENT (OUTPATIENT)
Dept: OTOLARYNGOLOGY | Facility: CLINIC | Age: 21
End: 2021-10-22
Payer: COMMERCIAL

## 2021-10-22 PROCEDURE — 31231 NASAL ENDOSCOPY DX: CPT

## 2021-10-22 PROCEDURE — 99214 OFFICE O/P EST MOD 30 MIN: CPT | Mod: 25

## 2021-10-22 RX ORDER — ACETAMINOPHEN AND CODEINE 300; 30 MG/1; MG/1
300-30 TABLET ORAL
Qty: 15 | Refills: 0 | Status: DISCONTINUED | COMMUNITY
Start: 2021-04-21 | End: 2021-10-22

## 2021-10-22 RX ORDER — CEFUROXIME AXETIL 250 MG/1
250 TABLET ORAL
Qty: 20 | Refills: 0 | Status: DISCONTINUED | COMMUNITY
Start: 2021-04-21 | End: 2021-10-22

## 2021-10-22 RX ORDER — PREDNISONE 20 MG/1
20 TABLET ORAL
Qty: 10 | Refills: 0 | Status: DISCONTINUED | COMMUNITY
Start: 2021-02-24 | End: 2021-10-22

## 2021-10-22 RX ORDER — FEXOFENADINE HCL 60 MG
CAPSULE ORAL
Refills: 0 | Status: DISCONTINUED | COMMUNITY
End: 2021-10-22

## 2021-10-22 RX ORDER — FAMOTIDINE 40 MG/1
40 TABLET, FILM COATED ORAL
Qty: 30 | Refills: 3 | Status: DISCONTINUED | COMMUNITY
Start: 2021-08-20 | End: 2021-10-22

## 2021-10-22 RX ORDER — PREDNISONE 20 MG/1
20 TABLET ORAL DAILY
Qty: 4 | Refills: 0 | Status: DISCONTINUED | COMMUNITY
Start: 2021-04-21 | End: 2021-10-22

## 2021-10-28 LAB — EAR NOSE AND THROAT CULTURE: ABNORMAL

## 2021-10-28 NOTE — ADDENDUM
[FreeTextEntry1] : 10/24/21  culture nl shruthi-\par 10/28/21  p acnes is final culture-  would not rx at this time unless sx persist.

## 2021-10-28 NOTE — CONSULT LETTER
[DrAkua  ___] : Dr. AARON [FreeTextEntry2] : Glen Sheth MD [FreeTextEntry1] : \par \par Dear  Glen\par \par I had the pleasure of seeing your patient today.  \par Please see my note below.\par \par \par Thank you very much for allowing me to participate in the care of your patient.\par \par Sincerely,\par \par \par I hope this note finds you and your family  well.   \par \par \par Sumeet\par \par \par \par \par Hans Baptiste MD\par [facility]\par [facility]\par  [facility]\par \par

## 2021-10-28 NOTE — ASSESSMENT
[FreeTextEntry1] : It was my impression that while she has no evidence of recurrence of the polypoid disease, her mucosa is quite inflamed and congested throughout her upper respiratory tract.  There was some whitish discharge around the right eustachian tube and I cultured this endoscopically.\par She complains of some shortness of breath while breathing in at times and she has gained some weight.\par She has complaints of reflux as well. She does not have a GI evaluation until December.\par Meantime, I recommended adding 40 mg of famotidine at bedtime to her regimen.\par I suggested stopping the nasal steroid rinses for at least a week to 10 days.\par I would treat further depending on the culture.\par She finds antihistamines or driving in I suggested going back to Azelastine and see if that helps.\par We discussed the option of further pulmonary evaluation and also a looking into interleukin antagonists.\par I also explained the difference between sensitivity and allergy and would like to reevaluate in 2 months or earlier if needed

## 2021-10-28 NOTE — PHYSICAL EXAM
[FreeTextEntry1] : General:\par The patient was alert and oriented and in no distress.\par Voice was clear.\par She looked as if she had gained a few pounds\par \par Ears:\par The external ears were normal without deformity.\par The ear canals were clear.\par The tympanic membranes were intact and normal.\par Chest:\par \par Examination of the chest was unremarkable. There were no bony deformities, no asymmetry, and no other abnormalities.  There were no wheezes or ronchi on auscultation\par Oral cavity:\par The oral mucosa was normal.\par The oral and base of tongue were clear and without mass.\par The gingival and buccal mucosa were moist and without lesions.\par The palate moved well.\par There was no cleft to the palate.\par There appeared to be good salivary flow.  \par There was no pus, erythema or mass in the oral cavity.\par \par Neck: \par The neck was symmetrical.\par The parotid and submandibular glands were normal without masses.\par The trachea was midline and there was no unusual crepitus.\par The thyroid was smooth and nontender and no masses were palpated.\par There was no significant cervical adenopathy.\par \par Nasal endoscopy:\par \par Nasal endoscopy was done with topical anesthesia and a flexible endoscope to evaluate for nasal polyps, chronic sinusitis and response to therapy.\par Endocoscopy was performed to inspect the interior of the nasal cavity, the nasal septum,  the middle and superior meati, the inferior, middle and superior turbinates, and the spheno-ethmoidal  recesses, the nasopharynx and eustachian tube orifices bilaterally\par \par Endoscopy was done with Covid precautions and with video. All risks and benefits were discussed with the patient and consent obtained.\par \par \par Evaluation showed that the septum was midline.  There was no significant mucosal disease.  The inferior turbinates and middle turbinates were normal.  On both sides, the surgically opened ethmoids, antra, and frontal recesses were clear.  There was no evidence of polypoid recurrences and no purulence.  The mucosa was boggy throughout and it was whitish discharge in the area of the right eustachian tube. Switching to a rigid pediatric 30° endoscope this was cultured..  The nasopharynx was benign.    The middle and  superior meati were normal and the sphenoethmoidal recesses were without evidence of disease\par \par Flexible fiberoptic laryngoscopy: CPT 11330\par Indications: Dysphagia\par Procedure note:\par \par Flexible fiberoptic laryngoscopy was performed because of dysphagia and because of the patient's inability to tolerate adequate mirror examination.\par \par The nasal cavity was anesthetized with Pontocaine and Afrin.\par The flexible endoscope was placed into the patient's nasal cavity.\par The nasopharynx was without masses.\par The oropharynx, vallecula and base of tongue had no masses.\par The epiglottis, aryepiglottic folds and false vocal cords were normal.\par The pyriform sinuses were without mucosal lesions or pooling of secretions.  \par The laryngeal ventricles were without lesions.\par The visualized subglottis was without mass.\par The lateral and posterior pharyngeal walls were clear and symmetrical.\par The vocal folds were clear and mobile; they abducted and adducted normally.\par There was no interarytenoid mass or fullness.\par \par Endoscopy was done with Covid precautions and with video. All risks and benefits were discussed with the patient and consent obtained.\par \par The oropharyngeal mucosa was quite boggy as well.

## 2021-10-28 NOTE — HISTORY OF PRESENT ILLNESS
[de-identified] : BILL WESTBROOK Was seen on October 22. She notes she had finished her allergy care. However over the last few days she has been quite congested. She noted some black tinge to her saliva. Her nasal saline has been seeing me for the last couple of weeks. Her nasal airway is quite time she feels as if she has difficulty breathing in that she feels as if she has heartburn as well. She has gained weight.The patient had no other ear nose or throat complaints at this visit.

## 2022-01-10 ENCOUNTER — APPOINTMENT (OUTPATIENT)
Dept: OTOLARYNGOLOGY | Facility: CLINIC | Age: 22
End: 2022-01-10

## 2022-01-24 ENCOUNTER — APPOINTMENT (OUTPATIENT)
Dept: OTOLARYNGOLOGY | Facility: CLINIC | Age: 22
End: 2022-01-24
Payer: COMMERCIAL

## 2022-01-24 VITALS — HEIGHT: 64 IN | BODY MASS INDEX: 20.49 KG/M2 | TEMPERATURE: 97 F | WEIGHT: 120 LBS

## 2022-01-24 DIAGNOSIS — H92.03 OTALGIA, BILATERAL: ICD-10-CM

## 2022-01-24 PROCEDURE — 31231 NASAL ENDOSCOPY DX: CPT

## 2022-01-24 PROCEDURE — 92567 TYMPANOMETRY: CPT

## 2022-01-24 PROCEDURE — 99213 OFFICE O/P EST LOW 20 MIN: CPT | Mod: 25

## 2022-01-24 RX ORDER — FAMOTIDINE 40 MG/1
40 TABLET, FILM COATED ORAL
Qty: 30 | Refills: 3 | Status: DISCONTINUED | COMMUNITY
Start: 2021-10-22 | End: 2022-01-24

## 2022-01-24 RX ORDER — AZELASTINE HYDROCHLORIDE 137 UG/1
137 SPRAY, METERED NASAL TWICE DAILY
Qty: 1 | Refills: 5 | Status: COMPLETED | COMMUNITY
Start: 2021-10-22 | End: 2022-01-24

## 2022-01-24 RX ORDER — CEFUROXIME AXETIL 250 MG/1
250 TABLET ORAL
Qty: 20 | Refills: 1 | Status: DISCONTINUED | COMMUNITY
Start: 2021-05-21 | End: 2022-01-24

## 2022-01-24 NOTE — PHYSICAL EXAM
[FreeTextEntry1] : Was seen in follow-up on January 24.  I had last seen her in October.  She has been doing quite well with her allergy care and budesonide every other day.  She feels that her nasal airway is quite good.  She has a history of pansinusitis and pansinus polyposis.  However, her reflux has been quite bothersome.  She has gone to a gastroenterologist who is now managing this.  However, she has noted recurrent right-sided epistaxis.  She did have this before surgery as well and her last nosebleed was 2 days ago.  She does not have bleeding elsewhere.  Finally, she is complaining that both ears feel full.  The patient had no other ear nose or throat complaints at this visit. [de-identified] : \par The patient was alert and oriented and in no distress.\par Voice was clear.\par \par Face:\par The patient had no facial asymmetry or mass.\par The skin was unremarkable.\par \par Eyes:\par The pupils were equal round and reactive to light and accommodation.\par There was no significant nystagmus or disconjugate gaze noted.\par \par Nose: \par The external nose had no significant deformity.  There was no facial tenderness.  On anterior rhinoscopy, the nasal mucosa was clear.  The anterior septum was midline.  There were no visualized polyps purulence  or masses.\par \par Oral cavity:\par The oral mucosa was normal.\par The oral and base of tongue were clear and without mass.\par The gingival and buccal mucosa were moist and without lesions.\par The palate moved well.\par There was no cleft to the palate.\par There appeared to be good salivary flow.  \par There was no pus, erythema or mass in the oral cavity.\par \par \par Ears:\par The external ears were normal without deformity.\par The ear canals were clear.\par The tympanic membranes were intact and normal.\par \par Neck: \par The neck was symmetrical.\par The parotid and submandibular glands were normal without masses.\par The trachea was midline and there was no unusual crepitus.\par The thyroid was smooth and nontender and no masses were palpated.\par There was no significant cervical adenopathy.\par \par \par Neuro:\par Neurologically, the patient was awake, alert, and oriented to person, place and time. There were no obvious focal neurologic abnormalities.  Cranial nerves II through XII were grossly intact.\par \par \par TMJ:\par The temporomandibular joints were slightly tender in the jaws sublux out on opening with crepitus.   \par \par Nasal endoscopy: \par CPT 26715\par Procedure Note:\par \par Endoscopy was done with Covid precautions and with video. All risks and benefits were discussed with the patient and consent obtained.\par \par Nasal endoscopy was done with topical anesthesia of Pontocaine and Afrin and a      nasal endoscope.\par Indication: Nasal congestion, rule out sinusitis.\par Procedure: The nasal cavity was anesthetized with topical Afrin and Pontocaine. An  endoscope was used and inserted into the nasal cavity.\par Attention was first paid to the anterior nasal cavity.\par Endocoscopy was performed to inspect the interior of the nasal cavity, the nasal septum,  the middle and superior meati, the inferior, middle and superior turbinates, and the spheno-ethmoidal  recesses, the nasopharynx and eustachian tube orifices bilaterally. \par All findings were normal except:\par The mucosa is slightly boggy but the septum is midline and all the sinuses are widely patent without evidence of polypoid disease or sinus obstruction\par She has a thin prominent vessel on the right mid anterior septum.                                    I did not repeat an audiogram completely but her tympanograms were also type a bilaterally.

## 2022-01-24 NOTE — CONSULT LETTER
[DrAkua  ___] : Dr. AARON [FreeTextEntry2] : Glen Sheth MD [FreeTextEntry1] : \par \par Dear  Glen\par \par I had the pleasure of seeing your patient today.  \par Please see my note below.\par \par \par Thank you very much for allowing me to participate in the care of your patient.\par \par Sincerely,\par \par \par I hope this note finds you and your family  well.   \par \par \par Sumeet\par \par \par \par \par Hans Baptiste MD\par [facility]\par [facility]\par  [facility]\par \par

## 2022-01-24 NOTE — HISTORY OF PRESENT ILLNESS
[de-identified] : BILL WESTBROOK Was seen in follow-up on January 24.  I had last seen her in October.  She has been doing quite well with her allergy care and budesonide every other day.  She feels that her nasal airway is quite good.  She has a history of pansinusitis and pansinus polyposis.  However, her reflux has been quite bothersome.  She has gone to a gastroenterologist who is now managing this.  However, she has noted recurrent right-sided epistaxis.  She did have this before surgery as well and her last nosebleed was 2 days ago.  She does not have bleeding elsewhere.  Finally, she is complaining that both ears feel full.  The patient had no other ear nose or throat complaints at this visit.

## 2022-01-24 NOTE — ASSESSMENT
[FreeTextEntry1] : It was my impression that she was doing quite well.  Her sinuses are clear without evidence of recurrent disease.  I suggested continuing on her current regimen and her allergy care which seems to be helping her quite a bit.  I suggested repeat evaluation in 3 months or as needed. \par Her ear exam is normal and while some of her symptoms could be from her reflux–I recommended continuing with diet and her GI care–it seems more consistent with her jaw and this was discussed.  I did not see evidence of eustachian tube dysfunction\par She has the reflux and I suggested continuing with both her GI care and her diet\par It was my impression that the patient had had an episode of epistaxis.  There was no active bleeding and no obvious points to cauterize at this time.\par There is a prominent right anterior septal vessel and the bleeding was likely  from this.  In any case I reviewed the pathogenesis.  I suggested 3 days of Afrin and topical moisturizing for the heating season.  I explained that it will probably take about 2 weeks to heal completely and would like to see the patient should  the bleeding recur.

## 2022-05-09 ENCOUNTER — APPOINTMENT (OUTPATIENT)
Dept: OTOLARYNGOLOGY | Facility: CLINIC | Age: 22
End: 2022-05-09
Payer: COMMERCIAL

## 2022-05-09 PROCEDURE — 31231 NASAL ENDOSCOPY DX: CPT

## 2022-05-09 PROCEDURE — 99213 OFFICE O/P EST LOW 20 MIN: CPT | Mod: 25

## 2022-05-09 NOTE — ASSESSMENT
[FreeTextEntry1] : It was my impression that she is doing quite well on her current regimen.  In the past she had recurrences once stopping the budesonide.  She has no evidence of recurrent disease and she is close to being asymptomatic.  I explained that she could use antihistamines as needed and I recommend continuing with budesonide.  I explained stepwise treatment should that become necessary but at this point I would just recommend repeat evaluation in 6 months or earlier if needed.  She appears to have had an excellent result.

## 2022-05-09 NOTE — HISTORY OF PRESENT ILLNESS
[de-identified] : BILL WESTBROOK was seen in follow-up on May 9.  She has the history of pansinusitis with pansinus polyposis.  She also has the history of reflux which is now managed by her gastroenterologist and has a history of right-sided epistaxis.  She is using budesonide rinses probably every third day now.  She finds that she is doing quite well.  She still has some frontal cephalgia from time to time.  Her nasal airway is good and in spite of the season, her allergies have been controlled.  She has had no further epistaxis.  Dragon noncontributory

## 2022-05-09 NOTE — PHYSICAL EXAM
[FreeTextEntry1] : \par The patient was alert and oriented and in no distress.\par Voice was clear.\par \par Face:\par The patient had no facial asymmetry or mass.\par The skin was unremarkable.\par \par Eyes:\par The pupils were equal round and reactive to light and accommodation.\par There was no significant nystagmus or disconjugate gaze noted.\par \par Nose: \par The external nose had no significant deformity.  There was no facial tenderness.  On anterior rhinoscopy, the nasal mucosa was clear.  The anterior septum was midline.  There were no visualized polyps purulence  or masses.\par \par Oral cavity:\par The oral mucosa was normal.\par The oral and base of tongue were clear and without mass.\par The gingival and buccal mucosa were moist and without lesions.\par The palate moved well.\par There was no cleft to the palate.\par There appeared to be good salivary flow.  \par There was no pus, erythema or mass in the oral cavity.\par \par \par Ears:\par The external ears were normal without deformity.\par The ear canals were clear.\par The tympanic membranes were intact and normal.\par \par Neck: \par The neck was symmetrical.\par The parotid and submandibular glands were normal without masses.\par The trachea was midline and there was no unusual crepitus.\par The thyroid was smooth and nontender and no masses were palpated.\par There was no significant cervical adenopathy.\par \par \par Neuro:\par Neurologically, the patient was awake, alert, and oriented to person, place and time. There were no obvious focal neurologic abnormalities.  Cranial nerves II through XII were grossly intact.\par \par \par TMJ:\par The temporomandibular joints were nontender.\par There was no abnormal crepitus and no significant malocclusion\par \par Nasal endoscopy:\par \par Nasal endoscopy was done with topical anesthesia and a flexible endoscope to evaluate for nasal polyps, chronic sinusitis and response to therapy.\par Endocoscopy was performed to inspect the interior of the nasal cavity, the nasal septum,  the middle and superior meati, the inferior, middle and superior turbinates, and the spheno-ethmoidal  recesses, the nasopharynx and eustachian tube orifices bilaterally\par \par Endoscopy was done with Covid precautions and with video. All risks and benefits were discussed with the patient and consent obtained.\par \par \par Evaluation showed that the septum was midline.  There was no significant mucosal disease.  The inferior turbinates and middle turbinates were normal.  On both sides, the surgically opened ethmoids, antra, and frontal recesses were clear.  There was no evidence of polypoid recurrences and no purulence.  The mucosa was close to stage zero.  The nasopharynx was benign.    The middle and  superior meati were normal and the sphenoethmoidal recesses were without evidence of disease\par

## 2022-10-12 ENCOUNTER — NON-APPOINTMENT (OUTPATIENT)
Age: 22
End: 2022-10-12

## 2022-11-02 ENCOUNTER — APPOINTMENT (OUTPATIENT)
Dept: OTOLARYNGOLOGY | Facility: CLINIC | Age: 22
End: 2022-11-02

## 2022-11-02 VITALS — BODY MASS INDEX: 21.85 KG/M2 | WEIGHT: 128 LBS | HEIGHT: 64 IN

## 2022-11-02 PROCEDURE — 31231 NASAL ENDOSCOPY DX: CPT

## 2022-11-02 PROCEDURE — 99213 OFFICE O/P EST LOW 20 MIN: CPT | Mod: 25

## 2022-11-02 NOTE — REVIEW OF SYSTEMS
[Nasal Congestion] : nasal congestion [Hoarseness] : hoarseness [de-identified] : stuffy ears  [de-identified] : drainage and post nasal drip  [de-identified] : excessive phlegm and sore throat  [FreeTextEntry4] : cough

## 2022-11-02 NOTE — CONSULT LETTER
[FreeTextEntry2] : Glen Sheth MD [FreeTextEntry1] : \par \par Dear  Glen\par \par I had the pleasure of seeing your patient today.  \par Please see my note below.\par \par \par Thank you very much for allowing me to participate in the care of your patient.\par \par Sincerely,\par \par \par I hope this note finds you and your family  well.   \par \par \par Sumeet\par \par \par \par \par Hans Baptiste MD\par [facility]\par [facility]\par  [facility]\par \par  [DrAkua  ___] : Dr. AARON

## 2022-11-02 NOTE — HISTORY OF PRESENT ILLNESS
[de-identified] : BILL WESTBROOK was seen in follow-up on November 2.  I had last seen her 6 months ago.  She has been doing well although had a recent upper respiratory tract infection and feels a little bit more mucousy.  She is using the budesonide every other day.  She had endoscopic sinus surgery for a septal deflection and pansinusitis with nasal polyps in April 2021.  The patient had no other ear nose or throat complaints at this visit.

## 2022-11-02 NOTE — ASSESSMENT
[FreeTextEntry1] : It was my impression that she remains without recurrence a year and a half after surgery for septal deflection and pansinusitis with nasal polyps.  She feels a little more congested and while the sinuses look quite clear the anterior nasal cavity is more congested.  I suggested using the budesonide rinses daily and continuing with allergy care as needed and would like to reevaluate in 6 months or as necessary

## 2022-11-02 NOTE — PHYSICAL EXAM
[FreeTextEntry1] : Nasal endoscopy:\par \par Nasal endoscopy was done with topical anesthesia and a flexible endoscope to evaluate for nasal polyps, chronic sinusitis and response to therapy.\par Endocoscopy was performed to inspect the interior of the nasal cavity, the nasal septum,  the middle and superior meati, the inferior, middle and superior turbinates, and the spheno-ethmoidal  recesses, the nasopharynx and eustachian tube orifices bilaterally\par \par Endoscopy was done with Covid precautions and with video. All risks and benefits were discussed with the patient and consent obtained.\par \par \par Evaluation showed that the septum was midline.  There was no significant mucosal disease.  The inferior turbinates and middle turbinates were normal.  On both sides, the surgically opened ethmoids, antra, and frontal recesses were clear.  There was no evidence of polypoid recurrences and no purulence.  The mucosa was close to stage zero.  The nasopharynx was benign.    The middle and  superior meati were normal and the sphenoethmoidal recesses were without evidence of disease\par \par The anterior mucosa, however was moderately boggy\par The patient was alert and oriented and in no distress.\par Voice was clear.\par \par Face:\par The patient had no facial asymmetry or mass.\par The skin was unremarkable.\par \par Eyes:\par The pupils were equal round and reactive to light and accommodation.\par There was no significant nystagmus or disconjugate gaze noted.\par \par Nose: \par The external nose had no significant deformity.  There was no facial tenderness.  On anterior rhinoscopy, the nasal mucosa was clear.  The anterior septum was midline.  There were no visualized polyps purulence  or masses.\par \par Oral cavity:\par The oral mucosa was normal.\par The oral and base of tongue were clear and without mass.\par The gingival and buccal mucosa were moist and without lesions.\par The palate moved well.\par There was no cleft to the palate.\par There appeared to be good salivary flow.  \par There was no pus, erythema or mass in the oral cavity.\par \par \par Ears:\par The external ears were normal without deformity.\par The ear canals were clear.\par The tympanic membranes were intact and normal.\par \par Neck: \par The neck was symmetrical.\par The parotid and submandibular glands were normal without masses.\par The trachea was midline and there was no unusual crepitus.\par The thyroid was smooth and nontender and no masses were palpated.\par There was no significant cervical adenopathy.\par \par \par Neuro:\par Neurologically, the patient was awake, alert, and oriented to person, place and time. There were no obvious focal neurologic abnormalities.  Cranial nerves II through XII were grossly intact.\par \par \par TMJ:\par The temporomandibular joints were nontender.\par There was no abnormal crepitus and no significant malocclusion\par

## 2023-03-24 ENCOUNTER — EMERGENCY (EMERGENCY)
Facility: HOSPITAL | Age: 23
LOS: 1 days | Discharge: HOME | End: 2023-03-24
Admitting: STUDENT IN AN ORGANIZED HEALTH CARE EDUCATION/TRAINING PROGRAM
Payer: COMMERCIAL

## 2023-03-24 VITALS
HEART RATE: 109 BPM | SYSTOLIC BLOOD PRESSURE: 104 MMHG | WEIGHT: 127.65 LBS | DIASTOLIC BLOOD PRESSURE: 56 MMHG | OXYGEN SATURATION: 98 % | HEIGHT: 64 IN | RESPIRATION RATE: 18 BRPM | TEMPERATURE: 100 F

## 2023-03-24 LAB
FLUAV AG NPH QL: SIGNIFICANT CHANGE UP
FLUBV AG NPH QL: SIGNIFICANT CHANGE UP
RSV RNA NPH QL NAA+NON-PROBE: SIGNIFICANT CHANGE UP
SARS-COV-2 RNA SPEC QL NAA+PROBE: DETECTED

## 2023-03-24 PROCEDURE — 87637 SARSCOV2&INF A&B&RSV AMP PRB: CPT

## 2023-03-24 PROCEDURE — 99283 EMERGENCY DEPT VISIT LOW MDM: CPT

## 2023-03-24 PROCEDURE — 99284 EMERGENCY DEPT VISIT MOD MDM: CPT

## 2023-03-24 RX ORDER — ACETAMINOPHEN 500 MG
1000 TABLET ORAL ONCE
Refills: 0 | Status: COMPLETED | OUTPATIENT
Start: 2023-03-24 | End: 2023-03-24

## 2023-03-24 RX ADMIN — Medication 1000 MILLIGRAM(S): at 18:41

## 2023-03-24 NOTE — ED PROVIDER NOTE - CLINICAL SUMMARY MEDICAL DECISION MAKING FREE TEXT BOX
pt w/uri symptoms x 1 d, no exam findings to suggest bacterial etiology, viral swab sent, symptom control and supportive care discussed at length, f/u w/pmd, strict return precautions given

## 2023-03-24 NOTE — ED ADULT NURSE NOTE - OBJECTIVE STATEMENT
23 y/o female c/o cough, fever and chills since yesterday, Pt denies any urinary symptoms, denies nausea or vomiting.

## 2023-03-24 NOTE — ED PROVIDER NOTE - OBJECTIVE STATEMENT
The pt is a 21 y/o F, who presents to ED c/o fever, myalgias, malaise, dry cough and congestion x 1 d. Pt has taken ibuprofen. Vaccinated for covid. Denies cp, sob, dysphagia, n/v/d, abd pain, dizziness, syncope.

## 2023-03-24 NOTE — ED PROVIDER NOTE - PATIENT PORTAL LINK FT
You can access the FollowMyHealth Patient Portal offered by [facility] by registering at the following website: [email or web address]. By joining [facility]’s FollowMyHealth portal, you will also be able to view your health information using other applications (apps) compatible with our system.

## 2023-03-24 NOTE — ED ADULT TRIAGE NOTE - CHIEF COMPLAINT QUOTE
21 y/o female c/o cough, fever and chills since yesterday, Pt reports having a T 103.3 at 2PM, took ibuprofen 600 mg at 2Pmm and then another ibuprofen at 5PM. Pt denies any urinary symptoms, denies nausea or vomiting.

## 2023-03-24 NOTE — ED ADULT NURSE NOTE - CHIEF COMPLAINT QUOTE
Problem: Discharge Planning  Goal: Knowledge of discharge instructions  Description  Knowledge of discharge instructions     Outcome: Met This Shift  Note:   Verbalized understanding of discharge instructions, follow-up appointments, and when to call the physician. Intervention: Discharge to appropriate level of care  Note:   Discuss understanding of discharge instructions,follow-up appointments, and when to call the physician. Problem: Falls - Risk of:  Goal: Will remain free from falls  Description  Will remain free from falls  Outcome: Met This Shift  Note:   No falls occurred with visit today. Intervention: Assess risk factors for falls  Description  Assess risk factors for falls  Note:   Verbalized understanding of fall prevention to ask for assistance with ambulation. Call light within reach. Transported around unit in wheelchair. Problem: Infection - Central Venous Catheter-Associated Bloodstream Infection:  Goal: Will show no infection signs and symptoms  Description  Will show no infection signs and symptoms  Outcome: Met This Shift  Note:   Mediport site with no redness or warmth. Skin over port site intact with no signs of breakdown noted. Patient verbalizes signs/symptoms of port infection and when to notify the physician. Intervention: Infection risk assessment  Description  Infection risk assessment  Note:   Instructed to monitor for signs/symptoms of infection at [facility] [address] [facility] and call MD if problems develop. Care plan reviewed with patient and spouse. Patient and spouse verbalize understanding of the plan of care and contribute to goal setting. 21 y/o female c/o cough, fever and chills since yesterday, Pt reports having a T 103.3 at 2PM, took ibuprofen 600 mg at 2Pmm and then another ibuprofen at 5PM. Pt denies any urinary symptoms, denies nausea or vomiting.

## 2023-03-24 NOTE — ED PROVIDER NOTE - NSFOLLOWUPINSTRUCTIONS_ED_ALL_ED_FT
Viral Respiratory Infection  DRINK LOTS OF FLUIDS, TAKE TYLENOL (1GR) VS IBUPROFEN (600MG) - ALTERNATE (6 VS 8 HRS), REST, FOLLOW UP WITH YOUR PMD  A viral respiratory infection is an illness that affects parts of the body used for breathing, like the lungs, nose, and throat. It is caused by a germ called a virus. Symptoms can include runny nose, coughing, sneezing, fatigue, body aches, sore throat, fever, or headache. Over the counter medicine can be used to manage the symptoms but the infection typically goes away on its own in 5 to 10 days.     SEEK IMMEDIATE MEDICAL CARE IF YOU HAVE ANY OF THE FOLLOWING SYMPTOMS: shortness of breath, chest pain, fever over 10 days, or lightheadedness/dizziness.

## 2023-03-24 NOTE — ED PROVIDER NOTE - RESPIRATORY, MLM
Breath sounds clear and equal bilaterally. no rales, no rhonchi, no wheezes b/l, no accessory muscle use, speaking in long full sentences, no cough noted

## 2023-03-27 DIAGNOSIS — Z88.8 ALLERGY STATUS TO OTHER DRUGS, MEDICAMENTS AND BIOLOGICAL SUBSTANCES: ICD-10-CM

## 2023-03-27 DIAGNOSIS — M79.10 MYALGIA, UNSPECIFIED SITE: ICD-10-CM

## 2023-03-27 DIAGNOSIS — U07.1 COVID-19: ICD-10-CM

## 2023-03-27 DIAGNOSIS — Z88.2 ALLERGY STATUS TO SULFONAMIDES: ICD-10-CM

## 2023-03-27 DIAGNOSIS — R53.81 OTHER MALAISE: ICD-10-CM

## 2023-03-27 DIAGNOSIS — R50.9 FEVER, UNSPECIFIED: ICD-10-CM

## 2023-04-26 ENCOUNTER — APPOINTMENT (OUTPATIENT)
Dept: OTOLARYNGOLOGY | Facility: CLINIC | Age: 23
End: 2023-04-26
Payer: COMMERCIAL

## 2023-04-26 VITALS — HEIGHT: 64 IN | WEIGHT: 125 LBS | BODY MASS INDEX: 21.34 KG/M2

## 2023-04-26 DIAGNOSIS — J32.9 CHRONIC SINUSITIS, UNSPECIFIED: ICD-10-CM

## 2023-04-26 DIAGNOSIS — R05.3 CHRONIC COUGH: ICD-10-CM

## 2023-04-26 DIAGNOSIS — J33.9 CHRONIC SINUSITIS, UNSPECIFIED: ICD-10-CM

## 2023-04-26 DIAGNOSIS — U09.9 CHRONIC COUGH: ICD-10-CM

## 2023-04-26 PROCEDURE — 99214 OFFICE O/P EST MOD 30 MIN: CPT | Mod: 25

## 2023-04-26 PROCEDURE — 31231 NASAL ENDOSCOPY DX: CPT

## 2023-04-26 RX ORDER — FLUTICASONE PROPIONATE 50 UG/1
50 SPRAY, METERED NASAL
Qty: 1 | Refills: 5 | Status: DISCONTINUED | COMMUNITY
Start: 2021-02-24 | End: 2023-04-26

## 2023-04-26 RX ORDER — FLUOXETINE HCL 10 MG
TABLET ORAL
Refills: 0 | Status: DISCONTINUED | COMMUNITY
End: 2023-04-26

## 2023-04-26 NOTE — PHYSICAL EXAM
[FreeTextEntry1] : \par The patient was alert and oriented and in no distress.\par Voice was clear.\par \par Face:\par The patient had no facial asymmetry or mass.\par The skin was unremarkable.\par \par Eyes:\par The pupils were equal round and reactive to light and accommodation.\par There was no significant nystagmus or disconjugate gaze noted.\par \par Nose: \par The external nose had no significant deformity.  There was no facial tenderness.  On anterior rhinoscopy, the nasal mucosa was clear.  The anterior septum was midline.  There were no visualized polyps purulence  or masses.\par \par Oral cavity:\par The oral mucosa was normal.\par The oral and base of tongue were clear and without mass.\par The gingival and buccal mucosa were moist and without lesions.\par The palate moved well.\par There was no cleft to the palate.\par There appeared to be good salivary flow.  \par There was no pus, erythema or mass in the oral cavity.\par \par \par Ears:\par The external ears were normal without deformity.\par The ear canals were clear.\par The tympanic membranes were intact and normal.\par \par Neck: \par The neck was symmetrical.\par The parotid and submandibular glands were normal without masses.\par The trachea was midline and there was no unusual crepitus.\par The thyroid was smooth and nontender and no masses were palpated.\par There was no significant cervical adenopathy.\par \par \par Neuro:\par Neurologically, the patient was awake, alert, and oriented to person, place and time. There were no obvious focal neurologic abnormalities.  Cranial nerves II through XII were grossly intact.\par \par \par TMJ:\par The temporomandibular joints were nontender.\par There was no abnormal crepitus and no significant malocclusion\par \par Nasal endoscopy:\par \par Nasal endoscopy was done with topical anesthesia and a flexible endoscope to evaluate for nasal polyps, chronic sinusitis and response to therapy.\par Endocoscopy was performed to inspect the interior of the nasal cavity, the nasal septum,  the middle and superior meati, the inferior, middle and superior turbinates, and the spheno-ethmoidal  recesses, the nasopharynx and eustachian tube orifices bilaterally\par \par Endoscopy was done with Covid precautions and with video. All risks and benefits were discussed with the patient and consent obtained.\par \par \par Evaluation showed that the septum was midline.  There was no significant mucosal disease.  The inferior turbinates and middle turbinates were normal.  On both sides, the surgically opened ethmoids, antra, and frontal recesses were clear.  There was no evidence of polypoid recurrences and no purulence.  The mucosa was close to stage zero.  The nasopharynx was benign.    The middle and  superior meati were normal and the sphenoethmoidal recesses were without evidence of disease. There were no recurrent polyps and the nasal mucous appeared normal.\par The mucosa was slightly boggy but there was no evidence of recurrent disease\par \par Flexible fiberoptic laryngoscopy: [facility] [address]\par Indications: Dysphagia\par Procedure note:\par \par Flexible fiberoptic laryngoscopy was performed because of dysphagia and because of the patient's inability to tolerate adequate mirror examination.\par \par The nasal cavity was anesthetized with Pontocaine and Afrin.\par The flexible endoscope was placed into the patient's nasal cavity.\par The nasopharynx was without masses.\par The oropharynx, vallecula and base of tongue had no masses.\par The epiglottis, aryepiglottic folds and false vocal cords were normal.\par The pyriform sinuses were without mucosal lesions or pooling of secretions.  \par The laryngeal ventricles were without lesions.\par The visualized subglottis was without mass.\par The lateral and posterior pharyngeal walls were clear and symmetrical.\par The vocal folds were clear and mobile; they abducted and adducted normally.\par There was no interarytenoid mass or fullness.\par \par Endoscopy was done with Covid precautions and with video. All risks and benefits were discussed with the patient and consent obtained.\par \par \par

## 2023-04-26 NOTE — HISTORY OF PRESENT ILLNESS
[de-identified] : BILL WESTBROOK was seen in follow-up on April 26.  She has been doing quite well.  She had endoscopic sinus surgery for septal deflection and pansinusitis with nasal polyps in April 2021.  Stopping budesonide rinses cause the symptoms to recur.  She is now using it about every other day and feels fine.  She had COVID 3 weeks ago and still has a slightly yellow productive cough.  The patient had no other ear nose or throat complaints at this visit.

## 2023-04-26 NOTE — CONSULT LETTER
[DrAkua  ___] : Dr. AARON [FreeTextEntry2] : Glen Sheth MD [FreeTextEntry1] : \par \par Dear  Glen\par \par I had the pleasure of seeing your patient today.  \par Please see my note below.\par \par \par Thank you very much for allowing me to participate in the care of your patient.\par \par Sincerely,\par \par \par I hope this note finds you and your family  well.   \par \par \par Sumeet\par \par \par \par \par Hans Baptiste MD\par [facility]\par [facility]\par  [facility]\par \par

## 2023-04-26 NOTE — ASSESSMENT
[FreeTextEntry1] : It was my impression that she is doing quite well without recurrent disease using just the budesonide rinse about every other day or perhaps even every third day.  I recommended continuing to do this regimen, especially because in the past stopping it because the polyps to recur.  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  I suggested using guaifenesin to help thin the secretions and hydration but if this does not resolve I asked that she speak to her primary physician or Dr. Sheth about whether to use an inhaled steroid\par I would like to see the patient back in follow-up in 6 months or earlier if needed.

## 2023-10-25 ENCOUNTER — APPOINTMENT (OUTPATIENT)
Dept: OTOLARYNGOLOGY | Facility: CLINIC | Age: 23
End: 2023-10-25

## 2024-01-18 ENCOUNTER — RX RENEWAL (OUTPATIENT)
Age: 24
End: 2024-01-18

## 2024-01-18 RX ORDER — BUDESONIDE 0.5 MG/2ML
0.5 INHALANT ORAL TWICE DAILY
Qty: 120 | Refills: 3 | Status: ACTIVE | COMMUNITY
Start: 2021-04-28 | End: 1900-01-01